# Patient Record
Sex: FEMALE | Race: WHITE | NOT HISPANIC OR LATINO | Employment: OTHER | URBAN - METROPOLITAN AREA
[De-identification: names, ages, dates, MRNs, and addresses within clinical notes are randomized per-mention and may not be internally consistent; named-entity substitution may affect disease eponyms.]

---

## 2017-01-31 DIAGNOSIS — I10 BENIGN ESSENTIAL HYPERTENSION: ICD-10-CM

## 2017-01-31 LAB
ANION GAP SERPL CALCULATED.3IONS-SCNC: 9 MMOL/L (ref 3–14)
BUN SERPL-MCNC: 12 MG/DL (ref 7–30)
CALCIUM SERPL-MCNC: 8.5 MG/DL (ref 8.5–10.1)
CHLORIDE SERPL-SCNC: 104 MMOL/L (ref 94–109)
CO2 SERPL-SCNC: 26 MMOL/L (ref 20–32)
CREAT SERPL-MCNC: 0.74 MG/DL (ref 0.52–1.04)
GFR SERPL CREATININE-BSD FRML MDRD: 80 ML/MIN/1.7M2
GLUCOSE SERPL-MCNC: 94 MG/DL (ref 70–99)
POTASSIUM SERPL-SCNC: 3.5 MMOL/L (ref 3.4–5.3)
SODIUM SERPL-SCNC: 139 MMOL/L (ref 133–144)

## 2017-01-31 PROCEDURE — 80048 BASIC METABOLIC PNL TOTAL CA: CPT | Performed by: INTERNAL MEDICINE

## 2017-01-31 PROCEDURE — 36415 COLL VENOUS BLD VENIPUNCTURE: CPT | Performed by: INTERNAL MEDICINE

## 2018-01-24 DIAGNOSIS — I10 BENIGN ESSENTIAL HYPERTENSION: ICD-10-CM

## 2018-01-24 NOTE — TELEPHONE ENCOUNTER
"Requested Prescriptions   Pending Prescriptions Disp Refills     triamterene-hydrochlorothiazide (DYAZIDE) 37.5-25 MG per capsule [Pharmacy Med Name: TRIAMTERENE-HCTZ 37.5-25MG CAPS]  Last Written Prescription Date:  12/20/2016  Last Fill Quantity: 90,  # refills: 3   Last Office Visit with [facility] or [facility] prescribing provider:  10/24/2016   Future Office Visit:      90 capsule 1     Sig: TAKE ONE CAPSULE BY MOUTH EVERY DAY    Diuretics (Including Combos) Protocol Failed    1/24/2018  1:36 PM       Failed - Blood pressure under 140/90    BP Readings from Last 3 Encounters:   10/24/16 133/81   10/07/16 137/89   08/31/16 (!) 160/109                Failed - Recent or future visit with authorizing provider's specialty    Patient had office visit in the last year or has a visit in the next 30 days with authorizing provider.  See \"Patient Info\" tab in inbasket, or \"Choose Columns\" in Meds & Orders section of the refill encounter.            Passed - Patient is age 18 or older       Passed - No active pregancy on record       Passed - Normal serum creatinine on file in past 12 months    Recent Labs   Lab Test  01/31/17   0730   CR  0.74             Passed - Normal serum potassium on file in past 12 months    Recent Labs   Lab Test  01/31/17   0730   POTASSIUM  3.5                   Passed - Normal serum sodium on file in past 12 months    Recent Labs   Lab Test  01/31/17   0730   NA  139             Passed - No positive pregnancy test in past 12 months        Beau Lezama RT (R)    "

## 2018-01-31 RX ORDER — TRIAMTERENE AND HYDROCHLOROTHIAZIDE 37.5; 25 MG/1; MG/1
1 CAPSULE ORAL DAILY
Qty: 30 CAPSULE | Refills: 0 | Status: SHIPPED | OUTPATIENT
Start: 2018-01-31 | End: 2018-02-02

## 2018-02-02 ENCOUNTER — OFFICE VISIT (OUTPATIENT)
Dept: FAMILY MEDICINE | Facility: CLINIC | Age: 63
End: 2018-02-02
Payer: COMMERCIAL

## 2018-02-02 VITALS
WEIGHT: 170.8 LBS | TEMPERATURE: 97.6 F | SYSTOLIC BLOOD PRESSURE: 132 MMHG | HEART RATE: 75 BPM | DIASTOLIC BLOOD PRESSURE: 89 MMHG | BODY MASS INDEX: 31.43 KG/M2 | HEIGHT: 62 IN

## 2018-02-02 DIAGNOSIS — M19.041 ARTHRITIS OF BOTH HANDS: Primary | ICD-10-CM

## 2018-02-02 DIAGNOSIS — I10 BENIGN ESSENTIAL HYPERTENSION: ICD-10-CM

## 2018-02-02 DIAGNOSIS — M19.042 ARTHRITIS OF BOTH HANDS: Primary | ICD-10-CM

## 2018-02-02 LAB
ANION GAP SERPL CALCULATED.3IONS-SCNC: 7 MMOL/L (ref 3–14)
BUN SERPL-MCNC: 12 MG/DL (ref 7–30)
CALCIUM SERPL-MCNC: 8.4 MG/DL (ref 8.5–10.1)
CHLORIDE SERPL-SCNC: 104 MMOL/L (ref 94–109)
CO2 SERPL-SCNC: 28 MMOL/L (ref 20–32)
CREAT SERPL-MCNC: 0.66 MG/DL (ref 0.52–1.04)
CRP SERPL-MCNC: 5.5 MG/L (ref 0–8)
GFR SERPL CREATININE-BSD FRML MDRD: >90 ML/MIN/1.7M2
GLUCOSE SERPL-MCNC: 85 MG/DL (ref 70–99)
POTASSIUM SERPL-SCNC: 3.4 MMOL/L (ref 3.4–5.3)
SODIUM SERPL-SCNC: 139 MMOL/L (ref 133–144)

## 2018-02-02 PROCEDURE — 80048 BASIC METABOLIC PNL TOTAL CA: CPT | Performed by: NURSE PRACTITIONER

## 2018-02-02 PROCEDURE — 36415 COLL VENOUS BLD VENIPUNCTURE: CPT | Performed by: NURSE PRACTITIONER

## 2018-02-02 PROCEDURE — 86431 RHEUMATOID FACTOR QUANT: CPT | Performed by: NURSE PRACTITIONER

## 2018-02-02 PROCEDURE — 99214 OFFICE O/P EST MOD 30 MIN: CPT | Performed by: NURSE PRACTITIONER

## 2018-02-02 PROCEDURE — 86140 C-REACTIVE PROTEIN: CPT | Performed by: NURSE PRACTITIONER

## 2018-02-02 RX ORDER — MELOXICAM 15 MG/1
15 TABLET ORAL DAILY
Qty: 30 TABLET | Refills: 3 | Status: SHIPPED | OUTPATIENT
Start: 2018-02-02 | End: 2018-10-11

## 2018-02-02 RX ORDER — TRIAMTERENE AND HYDROCHLOROTHIAZIDE 37.5; 25 MG/1; MG/1
1 CAPSULE ORAL DAILY
Qty: 90 CAPSULE | Refills: 3 | Status: SHIPPED | OUTPATIENT
Start: 2018-02-02 | End: 2019-03-12

## 2018-02-02 NOTE — PROGRESS NOTES
"  SUBJECTIVE:   Cameron Shah is a 62 year old female who presents to clinic today for the following health issues: blood pressure recheck, BP medication management.   Also complains of occasional mild pain in her hands, concern about possible arthritis. Denies edema, stiffness and difficulties using her hands.     Chief Complaint   Patient presents with     Refill Request     Pending      Health Maintenance     Notified she is due for mmamorgram and Colonoscopy      Musculoskeletal Problem     Possible arthirits, both knuckles have been starting to get painful, throbbing pain throuhout the day      Hypertension Follow-up      Outpatient blood pressures are not being checked.    Low Salt Diet: not monitoring salt      Amount of exercise or physical activity: 2-3 days/week for an average of 45-60 minutes    Problems taking medications regularly: No    Medication side effects: none    Diet: regular (no restrictions)    Problem list and histories reviewed & adjusted, as indicated.  Additional history: as documented    Labs reviewed in EPIC    Reviewed and updated as needed this visit by clinical staff  Tobacco  Allergies  Med Hx  Surg Hx  Fam Hx  Soc Hx      Reviewed and updated as needed this visit by Provider         ROS:  Constitutional, HEENT, cardiovascular, pulmonary, gi and gu systems are negative, except as otherwise noted.    OBJECTIVE:     /89  Pulse 75  Temp 97.6  F (36.4  C) (Tympanic)  Ht 5' 1.75\" (1.568 m)  Wt 170 lb 12.8 oz (77.5 kg)  BMI 31.49 kg/m2  Body mass index is 31.49 kg/(m^2).  GENERAL: healthy, alert and no distress  CV: regular rate and rhythm, normal S1 S2, no S3 or S4, no murmur, click or rub, no peripheral edema and peripheral pulses strong  MS: mild deformities of the bilateral hand knuckles, Heberden nodules. No edema and erythema noted.     Diagnostic Test Results:  pending    ASSESSMENT/PLAN:     1. Benign essential hypertension  -well controlled, refill provided   - " triamterene-hydrochlorothiazide (DYAZIDE) 37.5-25 MG per capsule; Take 1 capsule by mouth daily (Needs follow-up appointment for this medication)  Dispense: 90 capsule; Refill: 3  - Basic metabolic panel    2. Arthritis of both hands  -likely OA, mild deformities, Heberden nodules   -will also evaluate for possible RA  -advised patient that unfortunately there is no cure of OA, can try antiinflammatory medications, Tylenol as needed and physical therapy   - meloxicam (MOBIC) 15 MG tablet; Take 1 tablet (15 mg) by mouth daily  Dispense: 30 tablet; Refill: 3  - CRP inflammation  - Rheumatoid factor    See Patient Instructions    AB Jones [facility]

## 2018-02-02 NOTE — MR AVS SNAPSHOT
After Visit Summary   2/2/2018    Cameron Shah    MRN: [id number]           Patient Information     Date Of Birth          1955        Visit Information        Provider Department      2/2/2018 10:00 AM Luna Avalos APRN CNP [facility]        Today's Diagnoses     Arthritis of both hands    -  1    Benign essential hypertension          Care Instructions    Labs: recheck electrolytes, renal function  RA factor and inflammatory markers    Will consider rheumatology referral     Continue Dyazide    BP well controlled                         Follow-ups after your visit        Who to contact     If you have questions or need follow up information about today's clinic visit or your schedule please contact [facility] directly at [phone].  Normal or non-critical lab and imaging results will be communicated to you by WhoJamhart, letter or phone within 4 business days after the clinic has received the results. If you do not hear from us within 7 days, please contact the clinic through Who[employer] or phone. If you have a critical or abnormal lab result, we will notify you by phone as soon as possible.  Submit refill requests through [employer] or call your pharmacy and they will forward the refill request to us. Please allow 3 business days for your refill to be completed.          Additional Information About Your Visit        MyChart Information     [employer] gives you secure access to your electronic health record. If you see a primary care provider, you can also send messages to your care team and make appointments. If you have questions, please call your primary care clinic.  If you do not have a primary care provider, please call [phone] and they will assist you.        Care EveryWhere ID     This is your Care EveryWhere ID. This could be used by other organizations to access your [facility] medical records  [id number]        Your Vitals Were     Pulse  "Temperature Height BMI (Body Mass Index)          75 97.6  F (36.4  C) (Tympanic) 5' 1.75\" (1.568 m) 31.49 kg/m2         Blood Pressure from Last 3 Encounters:   02/02/18 132/89   10/24/16 133/81   10/07/16 137/89    Weight from Last 3 Encounters:   02/02/18 170 lb 12.8 oz (77.5 kg)   08/31/16 173 lb (78.5 kg)   08/23/16 173 lb (78.5 kg)              We Performed the Following     Basic metabolic panel     CRP inflammation     Rheumatoid factor          Today's Medication Changes          These changes are accurate as of 2/2/18 10:22 AM.  If you have any questions, ask your nurse or doctor.               Start taking these medicines.        Dose/Directions    meloxicam 15 MG tablet   Commonly known as:  MOBIC   Used for:  Arthritis of both hands   Started by:  Luna Avalos APRN CNP        Dose:  15 mg   Take 1 tablet (15 mg) by mouth daily   Quantity:  30 tablet   Refills:  3            Where to get your medicines      These medications [facility] - [place], [place] - [address] [place] [address]  [address] [place] [address], [facility] [address]     Phone:  [phone]     triamterene-hydrochlorothiazide 37.5-25 MG per capsule         These medications were sent to [id number] - [facility] [address] AT [facility] OF [address] [place] [address]  [address] [facility] [address]     Phone:  [phone]     meloxicam 15 MG tablet                Primary Care Provider Office Phone # Fax #    Chelle Rivka Linton [phone] [address] [facility] [address]        Equal Access to Services     SINA LARSON AH: Urszula Leslie, kendrick sarabia, qaybta kaallc vega, neno churchill. Carol [facility] [phone].    ATENCIÓN: Si habla español, tiene a asif disposición servicios gratuitos de asistencia lingüística. Llame al [phone].    We comply with applicable federal civil rights laws and " [place] laws. We do not discriminate on the basis of race, color, national origin, age, disability, sex, sexual orientation, or gender identity.            Thank you!     Thank you for choosing [facility]  for your care. Our goal is always to provide you with excellent care. Hearing back from our patients is one way we can continue to improve our services. Please take a few minutes to complete the written survey that you may receive in the mail after your visit with us. Thank you!             Your Updated Medication List - Protect others around you: Learn how to safely use, store and throw away your medicines at www.disposemymeds.org.          This list is accurate as of 2/2/18 10:22 AM.  Always use your most recent med list.                   Brand Name Dispense Instructions for use Diagnosis    ALEVE PO      Take 220 mg by mouth as needed        ibuprofen 200 MG tablet   Generic drug:  ibuprofen      1 TABLET EVERY 4 TO 6 HOURS AS NEEDED        meloxicam 15 MG tablet    MOBIC    30 tablet    Take 1 tablet (15 mg) by mouth daily    Arthritis of both hands       TRAVATAN Z 0.004 % ophthalmic solution   Generic drug:  travoprost (BAK Free)           triamcinolone 0.1 % ointment    KENALOG    30 g    Apply sparingly to affected area two times daily for 14 days.    Eczema       triamterene-hydrochlorothiazide 37.5-25 MG per capsule    DYAZIDE    90 capsule    Take 1 capsule by mouth daily (Needs follow-up appointment for this medication)    Benign essential hypertension       TYLENOL PO      Take 500 mg by mouth every 4 hours as needed for mild pain or fever

## 2018-02-02 NOTE — NURSING NOTE
"Chief Complaint   Patient presents with     Refill Request     Pending      Health Maintenance     Notified she is due for mmamorgram and Colonoscopy      Musculoskeletal Problem     Possible arthirits, both knuckles have been starting to get painful, throbbing pain throuhout the day        Initial /89  Pulse 75  Temp 97.6  F (36.4  C) (Tympanic)  Ht 5' 1.75\" (1.568 m)  Wt 170 lb 12.8 oz (77.5 kg)  BMI 31.49 kg/m2 Estimated body mass index is 31.49 kg/(m^2) as calculated from the following:    Height as of this encounter: 5' 1.75\" (1.568 m).    Weight as of this encounter: 170 lb 12.8 oz (77.5 kg).  Medication Reconciliation: complete  "

## 2018-02-02 NOTE — PATIENT INSTRUCTIONS
Labs: recheck electrolytes, renal function  RA factor and inflammatory markers    Will consider rheumatology referral     Continue Dyazide    BP well controlled

## 2018-02-04 LAB — RHEUMATOID FACT SER NEPH-ACNC: <20 IU/ML (ref 0–20)

## 2018-02-07 ENCOUNTER — HOSPITAL ENCOUNTER (OUTPATIENT)
Dept: MAMMOGRAPHY | Facility: CLINIC | Age: 63
Discharge: HOME | End: 2018-02-07
Attending: INTERNAL MEDICINE | Admitting: INTERNAL MEDICINE
Payer: COMMERCIAL

## 2018-02-07 DIAGNOSIS — Z12.31 VISIT FOR SCREENING MAMMOGRAM: ICD-10-CM

## 2018-02-07 PROCEDURE — 77063 BREAST TOMOSYNTHESIS BI: CPT

## 2018-02-12 DIAGNOSIS — Z12.11 SPECIAL SCREENING FOR MALIGNANT NEOPLASMS, COLON: Primary | ICD-10-CM

## 2018-02-13 ENCOUNTER — ANESTHESIA EVENT (OUTPATIENT)
Dept: GASTROENTEROLOGY | Facility: CLINIC | Age: 63
End: 2018-02-13
Payer: COMMERCIAL

## 2018-02-13 NOTE — ANESTHESIA PREPROCEDURE EVALUATION
"  Anesthesia Evaluation     . Pt has had prior anesthetic. Type: General and MAC    No history of anesthetic complications          ROS/MED HX    ENT/Pulmonary:  - neg pulmonary ROS     Neurologic:  - neg neurologic ROS     Cardiovascular:     (+) hypertension----. : . . . :. .       METS/Exercise Tolerance:     Hematologic:  - neg hematologic  ROS       Musculoskeletal:  - neg musculoskeletal ROS       GI/Hepatic:  - neg GI/hepatic ROS       Renal/Genitourinary:  - ROS Renal section negative       Endo:     (+) Other Endocrine Disorder Hyperparathyroidism.      Psychiatric:     (+) psychiatric history anxiety      Infectious Disease:  - neg infectious disease ROS       Malignancy:      - no malignancy   Other: Comment: Hypercalcemia   - neg other ROS                 Physical Exam  Normal systems: cardiovascular, pulmonary and dental    Airway   Mallampati: III  TM distance: >3 FB  Neck ROM: full    Dental     Cardiovascular       Pulmonary     Other findings: Patient has been in the past that she has a \"small airway\".                Anesthesia Plan      History & Physical Review  History and physical reviewed and following examination; no interval change.    ASA Status:  2 .    NPO Status:  > 4 hours    Plan for MAC Reason for MAC:  Deep or markedly invasive procedure (G8)         Postoperative Care      Consents  Anesthetic plan, risks, benefits and alternatives discussed with:  Patient..                          .  "

## 2018-02-15 ENCOUNTER — ANESTHESIA (OUTPATIENT)
Dept: GASTROENTEROLOGY | Facility: CLINIC | Age: 63
End: 2018-02-15
Payer: COMMERCIAL

## 2018-02-15 ENCOUNTER — HOSPITAL ENCOUNTER (OUTPATIENT)
Facility: CLINIC | Age: 63
Discharge: HOME | End: 2018-02-15
Attending: SURGERY | Admitting: SURGERY
Payer: COMMERCIAL

## 2018-02-15 ENCOUNTER — SURGERY (OUTPATIENT)
Age: 63
End: 2018-02-15

## 2018-02-15 VITALS
OXYGEN SATURATION: 97 % | HEIGHT: 62 IN | DIASTOLIC BLOOD PRESSURE: 75 MMHG | SYSTOLIC BLOOD PRESSURE: 137 MMHG | BODY MASS INDEX: 31.28 KG/M2 | HEART RATE: 71 BPM | RESPIRATION RATE: 16 BRPM | WEIGHT: 170 LBS | TEMPERATURE: 98.8 F

## 2018-02-15 LAB — COLONOSCOPY: NORMAL

## 2018-02-15 PROCEDURE — 37000008 ZZH ANESTHESIA TECHNICAL FEE, 1ST 30 MIN: Performed by: SURGERY

## 2018-02-15 PROCEDURE — 25000128 H RX IP 250 OP 636: Performed by: NURSE ANESTHETIST, CERTIFIED REGISTERED

## 2018-02-15 PROCEDURE — 88305 TISSUE EXAM BY PATHOLOGIST: CPT | Performed by: SURGERY

## 2018-02-15 PROCEDURE — 45385 COLONOSCOPY W/LESION REMOVAL: CPT | Mod: PT | Performed by: SURGERY

## 2018-02-15 PROCEDURE — 25000125 ZZHC RX 250: Performed by: NURSE ANESTHETIST, CERTIFIED REGISTERED

## 2018-02-15 PROCEDURE — 45385 COLONOSCOPY W/LESION REMOVAL: CPT | Performed by: SURGERY

## 2018-02-15 PROCEDURE — 25000128 H RX IP 250 OP 636: Performed by: SURGERY

## 2018-02-15 PROCEDURE — 88305 TISSUE EXAM BY PATHOLOGIST: CPT | Mod: 26 | Performed by: SURGERY

## 2018-02-15 PROCEDURE — 25000125 ZZHC RX 250: Performed by: SURGERY

## 2018-02-15 RX ORDER — LIDOCAINE 40 MG/G
CREAM TOPICAL
Status: DISCONTINUED | OUTPATIENT
Start: 2018-02-15 | End: 2018-02-15 | Stop reason: HOSPADM

## 2018-02-15 RX ORDER — PROPOFOL 10 MG/ML
INJECTION, EMULSION INTRAVENOUS PRN
Status: DISCONTINUED | OUTPATIENT
Start: 2018-02-15 | End: 2018-02-15

## 2018-02-15 RX ORDER — SODIUM CHLORIDE, SODIUM LACTATE, POTASSIUM CHLORIDE, CALCIUM CHLORIDE 600; 310; 30; 20 MG/100ML; MG/100ML; MG/100ML; MG/100ML
INJECTION, SOLUTION INTRAVENOUS CONTINUOUS
Status: DISCONTINUED | OUTPATIENT
Start: 2018-02-15 | End: 2018-02-15 | Stop reason: HOSPADM

## 2018-02-15 RX ORDER — ONDANSETRON 2 MG/ML
4 INJECTION INTRAMUSCULAR; INTRAVENOUS
Status: DISCONTINUED | OUTPATIENT
Start: 2018-02-15 | End: 2018-02-15 | Stop reason: HOSPADM

## 2018-02-15 RX ORDER — PROPOFOL 10 MG/ML
INJECTION, EMULSION INTRAVENOUS CONTINUOUS PRN
Status: DISCONTINUED | OUTPATIENT
Start: 2018-02-15 | End: 2018-02-15

## 2018-02-15 RX ORDER — LIDOCAINE HYDROCHLORIDE 10 MG/ML
INJECTION, SOLUTION INFILTRATION; PERINEURAL PRN
Status: DISCONTINUED | OUTPATIENT
Start: 2018-02-15 | End: 2018-02-15

## 2018-02-15 RX ADMIN — LIDOCAINE HYDROCHLORIDE 50 MG: 10 INJECTION, SOLUTION INFILTRATION; PERINEURAL at 09:50

## 2018-02-15 RX ADMIN — SODIUM CHLORIDE, POTASSIUM CHLORIDE, SODIUM LACTATE AND CALCIUM CHLORIDE: 600; 310; 30; 20 INJECTION, SOLUTION INTRAVENOUS at 08:49

## 2018-02-15 RX ADMIN — PROPOFOL 150 MCG/KG/MIN: 10 INJECTION, EMULSION INTRAVENOUS at 09:50

## 2018-02-15 RX ADMIN — PROPOFOL 100 MG: 10 INJECTION, EMULSION INTRAVENOUS at 09:50

## 2018-02-15 RX ADMIN — LIDOCAINE HYDROCHLORIDE 1 ML: 10 INJECTION, SOLUTION EPIDURAL; INFILTRATION; INTRACAUDAL; PERINEURAL at 08:50

## 2018-02-15 NOTE — H&P
"62 year old year old female here for colonoscopy for screening.    Patient Active Problem List   Diagnosis     Papanicolaou smear of cervix with low grade squamous intraepithelial lesion (LGSIL)     DJD (degenerative joint disease) of cervical spine     Contact dermatitis     LT hand Ring Finger Injury at PIP joint     CARDIOVASCULAR SCREENING; LDL GOAL LESS THAN 160     Advanced directives, counseling/discussion     Anxiety     Benign essential hypertension     Hypercalcemia     Hyperparathyroidism (H)     Family history of factor V deficiency       Past Medical History:   Diagnosis Date     Endometriosis, site unspecified      Hypertension 2015     Osteoporosis July 29, 2016    Osteoporosis     Other and unspecified ovarian cyst      Papanicolaou smear of cervix with low grade squamous intraepithelial lesion (LGSIL) 2651-2614    cryo and lazer treatment       Past Surgical History:   Procedure Laterality Date     APPENDECTOMY  age 9     HYSTERECTOMY, PAP NO LONGER INDICATED       HYSTERECTOMY, JERAMY  8/03     PARATHYROIDECTOMY N/A 8/31/2016    Procedure: PARATHYROIDECTOMY;  Surgeon: Yi Pham MD;  Location: [facility] OR     SALPINGO OOPHORECTOMY,R/L/ELIANE  8/03    right     TONSILLECTOMY  age 7       [unfilled]    No current outpatient prescriptions on file.       Allergies   Allergen Reactions     Oxycodone Nausea       Pt reports that she has never smoked. She has never used smokeless tobacco. She reports that she drinks alcohol. She reports that she does not use illicit drugs.    Exam:  BP (!) 154/96  Temp 98.8  F (37.1  C) (Oral)  Resp 18  Ht 1.568 m (5' 1.75\")  Wt 77.1 kg (170 lb)  SpO2 98%  BMI 31.35 kg/m2    Awake, Alert OX3  Lungs - CTA bilaterally  CV - RRR, no murmurs, distal pulses intact  Abd - soft, non-distended, non-tender, +BS  Extr - No cyanosis or edema    A/P 62 year old year old female in need of colonoscopy for screening. Risks, benefits, alternatives, and complications were discussed " including the possibility of perforation and the patient agreed to proceed    Daniel Doherty MD

## 2018-02-15 NOTE — ANESTHESIA POSTPROCEDURE EVALUATION
Patient: Cameron Shah    Procedure(s):  colonoscopy - Wound Class: II-Clean Contaminated    Diagnosis:screening  Diagnosis Additional Information: No value filed.    Anesthesia Type:  MAC    Note:  Anesthesia Post Evaluation    Patient location during evaluation: Phase 2  Patient participation: Able to fully participate in evaluation  Level of consciousness: awake  Pain management: adequate  Airway patency: patent  Cardiovascular status: acceptable  Respiratory status: acceptable  Hydration status: acceptable  PONV: none     Anesthetic complications: None          Last vitals:  Vitals:    02/15/18 0824   BP: (!) 154/96   Resp: 18   Temp: 37.1  C (98.8  F)   SpO2: 98%         Electronically Signed By: AB Hoffman CRNA  February 15, 2018  10:08 AM

## 2018-02-15 NOTE — ANESTHESIA CARE TRANSFER NOTE
Patient: Cameron Shah    Procedure(s):  colonoscopy - Wound Class: II-Clean Contaminated    Diagnosis: screening  Diagnosis Additional Information: No value filed.    Anesthesia Type:   MAC     Note:  Airway :Room Air  Patient transferred to:Phase II  Handoff Report: Identifed the Patient, Identified the Reponsible Provider, Reviewed the pertinent medical history, Discussed the surgical course, Reviewed Intra-OP anesthesia mangement and issues during anesthesia, Set expectations for post-procedure period and Allowed opportunity for questions and acknowledgement of understanding      Vitals: (Last set prior to Anesthesia Care Transfer)    CRNA VITALS  2/15/2018 0937 - 2/15/2018 1007      2/15/2018             Pulse: 79    Ht Rate: 80    SpO2: 98 %                Electronically Signed By: AB Hoffman CRNA  February 15, 2018  10:07 AM

## 2018-02-19 LAB — COPATH REPORT: NORMAL

## 2018-10-11 DIAGNOSIS — M19.041 ARTHRITIS OF BOTH HANDS: ICD-10-CM

## 2018-10-11 DIAGNOSIS — M19.042 ARTHRITIS OF BOTH HANDS: ICD-10-CM

## 2018-10-11 RX ORDER — MELOXICAM 15 MG/1
TABLET ORAL
Qty: 30 TABLET | Refills: 11 | Status: SHIPPED | OUTPATIENT
Start: 2018-10-11

## 2018-10-11 NOTE — TELEPHONE ENCOUNTER
"Requested Prescriptions   Pending Prescriptions Disp Refills     meloxicam (MOBIC) 15 MG tablet [Pharmacy Med Name: MELOXICAM 15MG TABLETS] 30 tablet 0     Sig: TAKE 1 TABLET(15 MG) BY MOUTH DAILY    NSAID Medications Failed    10/11/2018 11:15 AM       Failed - Normal ALT on file in past 12 months    Recent Labs   Lab Test  10/19/13   1450   ALT  46            Failed - Normal AST on file in past 12 months    Recent Labs   Lab Test  10/19/13   1450   AST  39            Failed - Normal CBC on file in past 12 months    Recent Labs   Lab Test  03/17/16   0842   WBC  7.6   RBC  4.70   HGB  13.5   HCT  41.7   PLT  290       For [facility] ONLY: [id number] = WBC, [id number] = RBC         Passed - Blood pressure under 140/90 in past 12 months    BP Readings from Last 3 Encounters:   02/15/18 137/75   02/02/18 132/89   10/24/16 133/81                Passed - Recent (12 mo) or future (30 days) visit within the authorizing provider's specialty    Patient had office visit in the last 12 months or has a visit in the next 30 days with authorizing provider or within the authorizing provider's specialty.  See \"Patient Info\" tab in inbasket, or \"Choose Columns\" in Meds & Orders section of the refill encounter.           Passed - Patient is age 6-64 years       Passed - No active pregnancy on record       Passed - Normal serum creatinine on file in past 12 months    Recent Labs   Lab Test  02/02/18   1030   CR  0.66            Passed - No positive pregnancy test in past 12 months      Last Written Prescription Date:  2/2/18  Last Fill Quantity: 30,  # refills: 3   Last office visit: 2/2/2018 with prescribing provider:  Robbie   Future Office Visit:      Routing refill request to provider for review/approval because:  Labs not current:  CBC last completed 2016, AST And ALT last completed 2013  A break in medication        "

## 2019-03-12 ENCOUNTER — TELEPHONE (OUTPATIENT)
Dept: FAMILY MEDICINE | Facility: CLINIC | Age: 64
End: 2019-03-12

## 2019-03-12 DIAGNOSIS — I10 BENIGN ESSENTIAL HYPERTENSION: ICD-10-CM

## 2019-03-12 RX ORDER — TRIAMTERENE AND HYDROCHLOROTHIAZIDE 37.5; 25 MG/1; MG/1
1 CAPSULE ORAL DAILY
Qty: 30 CAPSULE | Refills: 0 | Status: SHIPPED | OUTPATIENT
Start: 2019-03-12

## 2019-03-12 NOTE — TELEPHONE ENCOUNTER
"Last Office Visit: 2/2/18  Medication last filled 2/2/18 for 12 month fill  Requested Prescriptions   Pending Prescriptions Disp Refills     triamterene-HCTZ (DYAZIDE) 37.5-25 MG capsule [Pharmacy Med Name: TRIAMTERENE-HCTZ 37.5-25MG CAPS] 90 capsule 3     Sig: TAKE ONE CAPSULE BY MOUTH EVERY DAY    Diuretics (Including Combos) Protocol Failed - 3/12/2019 11:14 AM       Failed - Blood pressure under 140/90 in past 12 months    BP Readings from Last 3 Encounters:   02/15/18 137/75   02/02/18 132/89   10/24/16 133/81                Failed - Recent (12 mo) or future (30 days) visit within the authorizing provider's specialty    Patient had office visit in the last 12 months or has a visit in the next 30 days with authorizing provider or within the authorizing provider's specialty.  See \"Patient Info\" tab in inbasket, or \"Choose Columns\" in Meds & Orders section of the refill encounter.             Failed - Normal serum creatinine on file in past 12 months    Recent Labs   Lab Test 02/02/18  1030   CR 0.66             Failed - Normal serum potassium on file in past 12 months    Recent Labs   Lab Test 02/02/18  1030   POTASSIUM 3.4                   Failed - Normal serum sodium on file in past 12 months    Recent Labs   Lab Test 02/02/18  1030                Passed - Medication is active on med list       Passed - Patient is age 18 or older       Passed - No active pregancy on record       Passed - No positive pregnancy test in past 12 months        Routed to provider:  Labs not current, patient last OV more than 12 months ago  "

## 2019-03-12 NOTE — TELEPHONE ENCOUNTER
"Requested Prescriptions   Pending Prescriptions Disp Refills     triamterene-HCTZ (DYAZIDE) 37.5-25 MG capsule [Pharmacy Med Name: TRIAMTERENE-HCTZ 37.5-25MG CAPS] 90 capsule 3    Last Written Prescription Date:  2/2/18  Last Fill Quantity: 90 cap,  # refills: 3   Last office visit: 2/2/2018 with prescribing provider:  Robbie   Future Office Visit:     Sig: TAKE ONE CAPSULE BY MOUTH EVERY DAY    Diuretics (Including Combos) Protocol Failed - 3/12/2019 10:11 AM       Failed - Blood pressure under 140/90 in past 12 months    BP Readings from Last 3 Encounters:   02/15/18 137/75   02/02/18 132/89   10/24/16 133/81                Failed - Recent (12 mo) or future (30 days) visit within the authorizing provider's specialty    Patient had office visit in the last 12 months or has a visit in the next 30 days with authorizing provider or within the authorizing provider's specialty.  See \"Patient Info\" tab in inbasket, or \"Choose Columns\" in Meds & Orders section of the refill encounter.             Failed - Normal serum creatinine on file in past 12 months    Recent Labs   Lab Test 02/02/18  1030   CR 0.66             Failed - Normal serum potassium on file in past 12 months    Recent Labs   Lab Test 02/02/18  1030   POTASSIUM 3.4                   Failed - Normal serum sodium on file in past 12 months    Recent Labs   Lab Test 02/02/18  1030                Passed - Medication is active on med list       Passed - Patient is age 18 or older       Passed - No active pregancy on record       Passed - No positive pregnancy test in past 12 months          "

## 2019-03-14 NOTE — TELEPHONE ENCOUNTER
Patient notified and understands plan.  She states she is seeing a new provider outside of [facility] so will not be following up. Mildred LORD RN

## 2019-11-03 ENCOUNTER — HEALTH MAINTENANCE LETTER (OUTPATIENT)
Age: 64
End: 2019-11-03

## 2020-11-16 ENCOUNTER — HEALTH MAINTENANCE LETTER (OUTPATIENT)
Age: 65
End: 2020-11-16

## 2021-07-21 ENCOUNTER — RECORDS - HEALTHEAST (OUTPATIENT)
Dept: ADMINISTRATIVE | Facility: CLINIC | Age: 66
End: 2021-07-21

## 2021-07-24 ENCOUNTER — HEALTH MAINTENANCE LETTER (OUTPATIENT)
Age: 66
End: 2021-07-24

## 2021-07-25 ENCOUNTER — HOSPITAL ENCOUNTER (EMERGENCY)
Facility: CLINIC | Age: 66
Discharge: HOME | End: 2021-07-25
Attending: STUDENT IN AN ORGANIZED HEALTH CARE EDUCATION/TRAINING PROGRAM | Admitting: STUDENT IN AN ORGANIZED HEALTH CARE EDUCATION/TRAINING PROGRAM
Payer: MEDICARE

## 2021-07-25 ENCOUNTER — APPOINTMENT (OUTPATIENT)
Dept: MRI IMAGING | Facility: CLINIC | Age: 66
End: 2021-07-25
Attending: STUDENT IN AN ORGANIZED HEALTH CARE EDUCATION/TRAINING PROGRAM
Payer: MEDICARE

## 2021-07-25 VITALS
OXYGEN SATURATION: 97 % | RESPIRATION RATE: 16 BRPM | HEART RATE: 74 BPM | TEMPERATURE: 97.9 F | SYSTOLIC BLOOD PRESSURE: 147 MMHG | BODY MASS INDEX: 32.27 KG/M2 | WEIGHT: 175 LBS | DIASTOLIC BLOOD PRESSURE: 97 MMHG

## 2021-07-25 DIAGNOSIS — H81.399 PERIPHERAL VERTIGO, UNSPECIFIED LATERALITY: ICD-10-CM

## 2021-07-25 LAB
ALBUMIN SERPL-MCNC: 4 G/DL (ref 3.4–5)
ALBUMIN UR-MCNC: NEGATIVE MG/DL
ALP SERPL-CCNC: 58 U/L (ref 40–150)
ALT SERPL W P-5'-P-CCNC: 23 U/L (ref 0–50)
ANION GAP SERPL CALCULATED.3IONS-SCNC: 8 MMOL/L (ref 3–14)
APPEARANCE UR: CLEAR
AST SERPL W P-5'-P-CCNC: 15 U/L (ref 0–45)
BASOPHILS # BLD AUTO: 0.1 10E3/UL (ref 0–0.2)
BASOPHILS NFR BLD AUTO: 1 %
BILIRUB SERPL-MCNC: 0.7 MG/DL (ref 0.2–1.3)
BILIRUB UR QL STRIP: NEGATIVE
BUN SERPL-MCNC: 13 MG/DL (ref 7–30)
CALCIUM SERPL-MCNC: 9.4 MG/DL (ref 8.5–10.1)
CHLORIDE BLD-SCNC: 106 MMOL/L (ref 94–109)
CO2 SERPL-SCNC: 23 MMOL/L (ref 20–32)
COLOR UR AUTO: YELLOW
CREAT SERPL-MCNC: 0.75 MG/DL (ref 0.52–1.04)
EOSINOPHIL # BLD AUTO: 0.1 10E3/UL (ref 0–0.7)
EOSINOPHIL NFR BLD AUTO: 1 %
ERYTHROCYTE [DISTWIDTH] IN BLOOD BY AUTOMATED COUNT: 13.4 % (ref 10–15)
GFR SERPL CREATININE-BSD FRML MDRD: 84 ML/MIN/1.73M2
GLUCOSE BLD-MCNC: 110 MG/DL (ref 70–99)
GLUCOSE UR STRIP-MCNC: NEGATIVE MG/DL
HCT VFR BLD AUTO: 44.9 % (ref 35–47)
HGB BLD-MCNC: 14.9 G/DL (ref 11.7–15.7)
HGB UR QL STRIP: NEGATIVE
HOLD SPECIMEN: NORMAL
IMM GRANULOCYTES # BLD: 0 10E3/UL
IMM GRANULOCYTES NFR BLD: 0 %
KETONES UR STRIP-MCNC: NEGATIVE MG/DL
LEUKOCYTE ESTERASE UR QL STRIP: ABNORMAL
LYMPHOCYTES # BLD AUTO: 1.6 10E3/UL (ref 0.8–5.3)
LYMPHOCYTES NFR BLD AUTO: 17 %
MCH RBC QN AUTO: 29 PG (ref 26.5–33)
MCHC RBC AUTO-ENTMCNC: 33.2 G/DL (ref 31.5–36.5)
MCV RBC AUTO: 87 FL (ref 78–100)
MONOCYTES # BLD AUTO: 0.5 10E3/UL (ref 0–1.3)
MONOCYTES NFR BLD AUTO: 6 %
NEUTROPHILS # BLD AUTO: 6.9 10E3/UL (ref 1.6–8.3)
NEUTROPHILS NFR BLD AUTO: 75 %
NITRATE UR QL: NEGATIVE
NRBC # BLD AUTO: 0 10E3/UL
NRBC BLD AUTO-RTO: 0 /100
PH UR STRIP: 7 [PH] (ref 5–7)
PLATELET # BLD AUTO: 344 10E3/UL (ref 150–450)
POTASSIUM BLD-SCNC: 3.4 MMOL/L (ref 3.4–5.3)
PROT SERPL-MCNC: 7.7 G/DL (ref 6.8–8.8)
RBC # BLD AUTO: 5.14 10E6/UL (ref 3.8–5.2)
RBC URINE: 1 /HPF
SODIUM SERPL-SCNC: 137 MMOL/L (ref 133–144)
SP GR UR STRIP: 1.01 (ref 1–1.03)
SQUAMOUS EPITHELIAL: 3 /HPF
UROBILINOGEN UR STRIP-MCNC: NORMAL MG/DL
WBC # BLD AUTO: 9.2 10E3/UL (ref 4–11)
WBC URINE: 9 /HPF

## 2021-07-25 PROCEDURE — 82040 ASSAY OF SERUM ALBUMIN: CPT | Performed by: STUDENT IN AN ORGANIZED HEALTH CARE EDUCATION/TRAINING PROGRAM

## 2021-07-25 PROCEDURE — 250N000013 HC RX MED GY IP 250 OP 250 PS 637: Performed by: STUDENT IN AN ORGANIZED HEALTH CARE EDUCATION/TRAINING PROGRAM

## 2021-07-25 PROCEDURE — 99285 EMERGENCY DEPT VISIT HI MDM: CPT | Mod: 25 | Performed by: STUDENT IN AN ORGANIZED HEALTH CARE EDUCATION/TRAINING PROGRAM

## 2021-07-25 PROCEDURE — A9585 GADOBUTROL INJECTION: HCPCS | Performed by: STUDENT IN AN ORGANIZED HEALTH CARE EDUCATION/TRAINING PROGRAM

## 2021-07-25 PROCEDURE — 96360 HYDRATION IV INFUSION INIT: CPT | Performed by: STUDENT IN AN ORGANIZED HEALTH CARE EDUCATION/TRAINING PROGRAM

## 2021-07-25 PROCEDURE — 70553 MRI BRAIN STEM W/O & W/DYE: CPT

## 2021-07-25 PROCEDURE — 93005 ELECTROCARDIOGRAM TRACING: CPT | Performed by: STUDENT IN AN ORGANIZED HEALTH CARE EDUCATION/TRAINING PROGRAM

## 2021-07-25 PROCEDURE — 81001 URINALYSIS AUTO W/SCOPE: CPT | Performed by: STUDENT IN AN ORGANIZED HEALTH CARE EDUCATION/TRAINING PROGRAM

## 2021-07-25 PROCEDURE — 85025 COMPLETE CBC W/AUTO DIFF WBC: CPT | Performed by: STUDENT IN AN ORGANIZED HEALTH CARE EDUCATION/TRAINING PROGRAM

## 2021-07-25 PROCEDURE — 255N000002 HC RX 255 OP 636: Performed by: STUDENT IN AN ORGANIZED HEALTH CARE EDUCATION/TRAINING PROGRAM

## 2021-07-25 PROCEDURE — 258N000003 HC RX IP 258 OP 636: Performed by: STUDENT IN AN ORGANIZED HEALTH CARE EDUCATION/TRAINING PROGRAM

## 2021-07-25 PROCEDURE — 36415 COLL VENOUS BLD VENIPUNCTURE: CPT | Performed by: STUDENT IN AN ORGANIZED HEALTH CARE EDUCATION/TRAINING PROGRAM

## 2021-07-25 PROCEDURE — 93010 ELECTROCARDIOGRAM REPORT: CPT | Performed by: STUDENT IN AN ORGANIZED HEALTH CARE EDUCATION/TRAINING PROGRAM

## 2021-07-25 RX ORDER — GADOBUTROL 604.72 MG/ML
8 INJECTION INTRAVENOUS ONCE
Status: COMPLETED | OUTPATIENT
Start: 2021-07-25 | End: 2021-07-25

## 2021-07-25 RX ORDER — MECLIZINE HYDROCHLORIDE 25 MG/1
25-50 TABLET ORAL EVERY 6 HOURS PRN
Qty: 30 TABLET | Refills: 0 | Status: SHIPPED | OUTPATIENT
Start: 2021-07-25

## 2021-07-25 RX ORDER — MECLIZINE HYDROCHLORIDE 25 MG/1
50 TABLET ORAL ONCE
Status: COMPLETED | OUTPATIENT
Start: 2021-07-25 | End: 2021-07-25

## 2021-07-25 RX ADMIN — SODIUM CHLORIDE, POTASSIUM CHLORIDE, SODIUM LACTATE AND CALCIUM CHLORIDE 1000 ML: 600; 310; 30; 20 INJECTION, SOLUTION INTRAVENOUS at 10:36

## 2021-07-25 RX ADMIN — MECLIZINE HYDROCHLORIDE 50 MG: 25 TABLET ORAL at 10:52

## 2021-07-25 RX ADMIN — GADOBUTROL 8 ML: 604.72 INJECTION INTRAVENOUS at 10:58

## 2021-07-25 NOTE — ED NOTES
Pt here with spouse with c/o dizziness since Friday. Pt took meclizine at home with no relief. No vomiting. Gait slightly  Unsteady but able to ambulate with SBA. Pt denies focal numbness or weakness. MRI checklist completed, pt changed into MRI gown.

## 2021-07-25 NOTE — ED PROVIDER NOTES
"  History     Chief Complaint   Patient presents with     Dizziness     HPI  Cameron Shah is a 66 year old female with past medical history which includes anxiety, hypertension, and hyperparathyroidism who presents for evaluation of dizziness.  Patient explains that Friday evening she developed a dizziness sensation\" like a carousel\" spinning around and reminiscent to an episode of vertigo from 1 year ago.  However her symptoms have persisted since Friday evening, exacerbated with movement and ambulation, improved with closing eyes and resting but not completely resolved.  She had taken some meclizine yesterday without change in symptoms.  Secondarily she notes 3 episodes of nonbloody diarrhea yesterday morning but that is also resolved.  She has been without headache, neck pain, visual changes, earache, chest pain, palpitations, cough, shortness of breath, nausea/vomiting, abdominal pain, or other symptoms.    Allergies:  Allergies   Allergen Reactions     Oxycodone Nausea       Problem List:    Patient Active Problem List    Diagnosis Date Noted     Family history of factor V deficiency 08/23/2016     Priority: Medium     Mother and brother.  Patient has not been tested.       Hyperparathyroidism (H) 08/22/2016     Priority: Medium     Hypercalcemia 03/22/2016     Priority: Medium     Benign essential hypertension 08/10/2015     Priority: Medium     Anxiety 02/26/2013     Priority: Medium     Advanced directives, counseling/discussion 10/30/2012     Priority: Medium     CARDIOVASCULAR SCREENING; LDL GOAL LESS THAN 160 10/31/2010     Priority: Medium     LT hand Ring Finger Injury at PIP joint 04/22/2009     Priority: Medium     DJD (degenerative joint disease) of cervical spine 10/31/2008     Priority: Medium     Contact dermatitis 10/31/2008     Priority: Medium     Papanicolaou smear of cervix with low grade squamous intraepithelial lesion (LGSIL)      Priority: Medium     cryo and lazer treatment      "     Past Medical History:    Past Medical History:   Diagnosis Date     Endometriosis, site unspecified      Hypertension      Osteoporosis 2016     Other and unspecified ovarian cyst      Papanicolaou smear of cervix with low grade squamous intraepithelial lesion (LGSIL) [id number]       Past Surgical History:    Past Surgical History:   Procedure Laterality Date     APPENDECTOMY  age 9     HYSTERECTOMY, PAP NO LONGER INDICATED       HYSTERECTOMY, JERAMY       PARATHYROIDECTOMY N/A 2016    Procedure: PARATHYROIDECTOMY;  Surgeon: Yi Pham MD;  Location: [facility]     SALPINGO OOPHORECTOMY,R/L/ELIANE      right     TONSILLECTOMY  age 7       Family History:    Family History   Problem Relation Age of Onset     Hypertension Mother      Thrombosis Mother         leg, has Factor V     C.A.D. Father         stents and bypass     Cancer Father         throat     Alcohol/Drug Maternal Grandmother          brain anyersm     Alcohol/Drug Maternal Grandfather      Alcohol/Drug Paternal Grandmother      Alcohol/Drug Paternal Grandfather      Hypertension Brother      Cancer Brother         lumps in arm and shoulder.  3 rounds of chemo     Hypertension Brother      Hypertension Sister      Hypertension Sister      Thrombosis Brother         has factor V     Breast Cancer No family hx of      Colon Cancer No family hx of        Social History:  Marital Status:   [2]  Social History     Tobacco Use     Smoking status: Never Smoker     Smokeless tobacco: Never Used   Substance Use Topics     Alcohol use: Yes     Comment: 2 wine a week     Drug use: No        Medications:    meclizine (ANTIVERT) 25 MG tablet  Acetaminophen (TYLENOL PO)  IBU-200 200 MG PO TABS  meloxicam (MOBIC) 15 MG tablet  Naproxen Sodium (ALEVE PO)  TRAVATAN Z 0.004 % ophthalmic solution  triamcinolone (KENALOG) 0.1 % ointment  triamterene-HCTZ (DYAZIDE) 37.5-25 MG capsule          Review of Systems  Constitutional:   Negative for fever or chills.  Eye: Negative for visual field deficits or double vision.  Cardiovascular:  Negative for palpitations or chest discomfort.  Respiratory:  Negative for cough or respiratory infectious symptoms.   Gastrointestinal: Diarrhea has resolved.  Negative for abdominal pain, nausea or vomiting.  Genitourinary:  Negative for dysuria or urgency.  Musculoskeletal: Negative for neck stiffness or back pain.  Neurological: Positive for room spinning sensation.  Negative for headache, weakness or sensory deficits.    All others reviewed and are negative.      Physical Exam   BP: (!) 148/99  Pulse: 100  Temp: 97.9  F (36.6  C)  Resp: 16  Weight: 79.4 kg (175 lb)  SpO2: 97 %      Physical Exam  Constitutional:  Well developed, well nourished.  Appears nontoxic and in no acute distress.    HENT:  Normocephalic and atraumatic.  Symmetric in appearance no facial droop.  Eyes:  Conjunctivae are normal.  Negative visual field deficit.  EOMI and denies diplopia.  PERRL.  Horizontal leftward beating nystagmus.  Negative test of skew.  Neck:  Neck supple.  Cardiovascular:  No cyanosis.  RRR.  No audible murmurs noted.    Respiratory:  Effort normal without sign of respiratory distress.  No audible wheezing or stridor.  CTAB.   Gastrointestinal:  Soft nondistended abdomen.  Nontender and without guarding.  No rigidity or rebound tenderness.  Negative Frey's sign.  Negative McBurney's point.    Musculoskeletal:  Moves extremities spontaneously.  Neurological:  Patient is alert.  Skin:  Skin is warm and dry.  Psychiatric:  Normal mood and affect.      ED Course        Procedures                EKG Interpretation:      Interpreted by: Arya Kidd  Time reviewed: Upon completion    Symptoms at time of EKG: Dizziness  Rhythm: Sinus  Rate: Normal  Axis: Normal    Conduction: None atypical   ST Segments/ T Waves: No pathologic ST-elevations or T-wave abnormalities.  Q Waves: None  Comparison to prior: Similar  morphology to previous     Clinical Impression: No sign of ischemia or arrhythmia         Critical Care time:  none               Results for orders placed or performed during the hospital encounter of 07/25/21 (from the past 24 hour(s))   CBC with platelets differential    Narrative    The following orders were created for panel order CBC with platelets differential.  Procedure                               Abnormality         Status                     ---------                               -----------         ------                     CBC with platelets and d...[[id number]]                      Final result                 Please view results for these tests on the individual orders.   Comprehensive metabolic panel   Result Value Ref Range    Sodium 137 133 - 144 mmol/L    Potassium 3.4 3.4 - 5.3 mmol/L    Chloride 106 94 - 109 mmol/L    Carbon Dioxide (CO2) 23 20 - 32 mmol/L    Anion Gap 8 3 - 14 mmol/L    Urea Nitrogen 13 7 - 30 mg/dL    Creatinine 0.75 0.52 - 1.04 mg/dL    Calcium 9.4 8.5 - 10.1 mg/dL    Glucose 110 (H) 70 - 99 mg/dL    Alkaline Phosphatase 58 40 - 150 U/L    AST 15 0 - 45 U/L    ALT 23 0 - 50 U/L    Protein Total 7.7 6.8 - 8.8 g/dL    Albumin 4.0 3.4 - 5.0 g/dL    Bilirubin Total 0.7 0.2 - 1.3 mg/dL    GFR Estimate 84 >60 mL/min/1.73m2   [facility]    Narrative    The following orders were created for panel order [facility].  Procedure                               Abnormality         Status                     ---------                               -----------         ------                     Extra Red Top Tube[[id number]]                               Final result               Extra Green Top (Lithium...[[id number]]                                                   Please view results for these tests on the individual orders.   UA reflex to Microscopic   Result Value Ref Range    Color Urine Yellow Colorless, Straw, Light Yellow, Yellow    Appearance Urine Clear Clear    Glucose  Urine Negative Negative mg/dL    Bilirubin Urine Negative Negative    Ketones Urine Negative Negative mg/dL    Specific Gravity Urine 1.008 1.003 - 1.035    Blood Urine Negative Negative    pH Urine 7.0 5.0 - 7.0    Protein Albumin Urine Negative Negative mg/dL    Urobilinogen Urine Normal Normal, 2.0 mg/dL    Nitrite Urine Negative Negative    Leukocyte Esterase Urine Trace (A) Negative    RBC Urine 1 <=2 /HPF    WBC Urine 9 (H) <=5 /HPF    Squamous Epithelials Urine 3 (H) <=1 /HPF   CBC with platelets and differential   Result Value Ref Range    WBC Count 9.2 4.0 - 11.0 10e3/uL    RBC Count 5.14 3.80 - 5.20 10e6/uL    Hemoglobin 14.9 11.7 - 15.7 g/dL    Hematocrit 44.9 35.0 - 47.0 %    MCV 87 78 - 100 fL    MCH 29.0 26.5 - 33.0 pg    MCHC 33.2 31.5 - 36.5 g/dL    RDW 13.4 10.0 - 15.0 %    Platelet Count 344 150 - 450 10e3/uL    % Neutrophils 75 %    % Lymphocytes 17 %    % Monocytes 6 %    % Eosinophils 1 %    % Basophils 1 %    % Immature Granulocytes 0 %    NRBCs per 100 WBC 0 <1 /100    Absolute Neutrophils 6.9 1.6 - 8.3 10e3/uL    Absolute Lymphocytes 1.6 0.8 - 5.3 10e3/uL    Absolute Monocytes 0.5 0.0 - 1.3 10e3/uL    Absolute Eosinophils 0.1 0.0 - 0.7 10e3/uL    Absolute Basophils 0.1 0.0 - 0.2 10e3/uL    Absolute Immature Granulocytes 0.0 <=0.0 10e3/uL    Absolute NRBCs 0.0 10e3/uL   Extra Red Top Tube   Result Value Ref Range    Hold Specimen JIC    Extra Tube    Narrative    The following orders were created for panel order Extra Tube.  Procedure                               Abnormality         Status                     ---------                               -----------         ------                     Extra Green Top (Lithium...[[id number]]                                                   Please view results for these tests on the individual orders.   MR Brain w/o & w Contrast    Narrative    MRI OF THE BRAIN WITHOUT AND WITH CONTRAST 7/25/2021 11:20 AM     COMPARISON: None.    HISTORY:  Dizziness,  non-specific.     TECHNIQUE: Axial diffusion-weighted with ADC map, axial T2-weighted  with fat saturation, axial T1-weighted, axial turboFLAIR and coronal  T1-weighted images of the brain were acquired without intravenous  contrast.  Following intravenous administration of gadolinium (8 mL  Gadavist), axial T1-weighted images of the brain were acquired.     FINDINGS: There is mild diffuse cerebral volume loss. There are  numerous tiny scattered focal areas of abnormal T2 signal  hyperintensity in the cerebral white matter bilaterally that are  consistent with sequela of chronic small vessel ischemic disease.    The ventricles and basal cisterns are within normal limits in  configuration given the degree of cerebral volume loss.  There is no  midline shift.  There are no extra-axial fluid collections.  There is  no evidence for stroke or acute intracranial hemorrhage.  There is no  abnormal contrast enhancement in the brain or its coverings.    There is no sinusitis or mastoiditis.      Impression    IMPRESSION:  Diffuse cerebral volume loss and cerebral white matter  changes consistent with chronic small vessel ischemic disease. No  evidence for acute intracranial pathology.    LENY DENTON MD         SYSTEM ID:  [id number]       Medications   lactated ringers BOLUS 1,000 mL (0 mLs Intravenous Stopped 7/25/21 1146)   meclizine (ANTIVERT) tablet 50 mg (50 mg Oral Given 7/25/21 1052)   gadobutrol (GADAVIST) injection 8 mL (8 mLs Intravenous Given 7/25/21 1058)       Assessments & Plan (with Medical Decision Making)   Cameron Shah is a 66 year old female who presents to the department for evaluation of vertigo.  She did have some mild nonbloody diarrhea yesterday but otherwise no infectious symptoms.  She is afebrile, hemodynamically stable, and with benign abdominal examination.  She has some horizontal nystagmus without visual field deficits or diplopia, negative test of skew.  MRI read by radiologist as having  no evidence for acute intracranial pathology such as infarct.  Symptoms also improved with meclizine in the department.  Patient seems to be suffering from peripheral vertigo, may be a viral etiology given the recent bout of diarrhea, recommend continuing with meclizine as directed and close monitoring for expected improvement.  A referral was also placed for dizziness and balance clinic in case symptoms do not readily improve.      Disclaimer:  This note consists of symbols derived from keyboarding, dictation, and/or voice recognition software.  As a result, there may be errors in the script that have gone undetected.  Please consider this when interpreting information found in the chart.        I have reviewed the nursing notes.    I have reviewed the findings, diagnosis, plan and need for follow up with the patient.       Discharge Medication List as of 7/25/2021 11:48 AM      START taking these medications    Details   meclizine (ANTIVERT) 25 MG tablet Take 1-2 tablets (25-50 mg) by mouth every 6 hours as needed for dizziness, Disp-30 tablet, R-0, E-Prescribe             Final diagnoses:   Peripheral vertigo, unspecified laterality       7/25/2021   [facility] EMERGENCY DEPT     Arya Kidd,   07/25/21 [id number]

## 2021-09-18 ENCOUNTER — HEALTH MAINTENANCE LETTER (OUTPATIENT)
Age: 66
End: 2021-09-18

## 2022-08-14 ENCOUNTER — HEALTH MAINTENANCE LETTER (OUTPATIENT)
Age: 67
End: 2022-08-14

## 2022-11-19 ENCOUNTER — HEALTH MAINTENANCE LETTER (OUTPATIENT)
Age: 67
End: 2022-11-19

## 2023-03-07 ENCOUNTER — APPOINTMENT (OUTPATIENT)
Dept: GENERAL RADIOLOGY | Facility: CLINIC | Age: 68
End: 2023-03-07
Attending: PHYSICIAN ASSISTANT
Payer: MEDICARE

## 2023-03-07 ENCOUNTER — HOSPITAL ENCOUNTER (EMERGENCY)
Facility: CLINIC | Age: 68
Discharge: HOME | End: 2023-03-07
Attending: PHYSICIAN ASSISTANT | Admitting: PHYSICIAN ASSISTANT
Payer: MEDICARE

## 2023-03-07 VITALS
HEART RATE: 92 BPM | SYSTOLIC BLOOD PRESSURE: 179 MMHG | DIASTOLIC BLOOD PRESSURE: 95 MMHG | RESPIRATION RATE: 18 BRPM | OXYGEN SATURATION: 98 % | TEMPERATURE: 97.5 F
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
## 2023-03-07 DIAGNOSIS — S41.152A DOG BITE OF LEFT ARM, INITIAL ENCOUNTER: ICD-10-CM

## 2023-03-07 PROCEDURE — 99213 OFFICE O/P EST LOW 20 MIN: CPT | Mod: 25 | Performed by: PHYSICIAN ASSISTANT

## 2023-03-07 PROCEDURE — G0463 HOSPITAL OUTPT CLINIC VISIT: HCPCS | Mod: 25 | Performed by: PHYSICIAN ASSISTANT

## 2023-03-07 PROCEDURE — 73070 X-RAY EXAM OF ELBOW: CPT | Mod: LT

## 2023-03-07 PROCEDURE — 12001 RPR S/N/AX/GEN/TRNK 2.5CM/<: CPT | Mod: LT | Performed by: PHYSICIAN ASSISTANT

## 2023-03-07 NOTE — ED PROVIDER NOTES
History     Chief Complaint   Patient presents with     Dog Bite     HPI  Cameron Shah is a 67 year old right-hand-dominant female who presents the urgent care with concern over dog bite to her left arm.  Patient reports that she was walking into animal kennel to pay a bill when a dog that was exiting lashed out and bit her arm unprovoked.  Owner of dog stated that animal is up to date with vaccinations and cattle staff confirmed that dog's did need to be vaccinated for rabies to stay there however she did not get any contact information on the dog or pet owner.  She complains of pain, swelling, ecchymosis of her left upper arm and intermittent paresthesias in her arm.  She has not had any fever, chills, myalgias, cough, dyspnea wheezing.  Her tetanus vaccine is up to date.      Allergies:  Allergies   Allergen Reactions     Oxycodone Nausea       Problem List:    Patient Active Problem List    Diagnosis Date Noted     Family history of factor V deficiency 08/23/2016     Priority: Medium     Mother and brother.  Patient has not been tested.       Hyperparathyroidism (H) 08/22/2016     Priority: Medium     Hypercalcemia 03/22/2016     Priority: Medium     Benign essential hypertension 08/10/2015     Priority: Medium     Anxiety 02/26/2013     Priority: Medium     Advanced directives, counseling/discussion 10/30/2012     Priority: Medium     CARDIOVASCULAR SCREENING; LDL GOAL LESS THAN 160 10/31/2010     Priority: Medium     LT hand Ring Finger Injury at PIP joint 04/22/2009     Priority: Medium     DJD (degenerative joint disease) of cervical spine 10/31/2008     Priority: Medium     Contact dermatitis 10/31/2008     Priority: Medium     Papanicolaou smear of cervix with low grade squamous intraepithelial lesion (LGSIL)      Priority: Medium     cryo and lazer treatment          Past Medical History:    Past Medical History:   Diagnosis Date     Endometriosis, site unspecified      Hypertension 2015      Osteoporosis 2016     Other and unspecified ovarian cyst      Papanicolaou smear of cervix with low grade squamous intraepithelial lesion (LGSIL) [phone]       Past Surgical History:    Past Surgical History:   Procedure Laterality Date     APPENDECTOMY  age 9     HYSTERECTOMY, PAP NO LONGER INDICATED       HYSTERECTOMY, JERAMY       PARATHYROIDECTOMY N/A 2016    Procedure: PARATHYROIDECTOMY;  Surgeon: Yi Pham MD;  Location: [facility] OR     SALPINGO OOPHORECTOMY,R/L/ELIANE      right     TONSILLECTOMY  age 7       Family History:    Family History   Problem Relation Age of Onset     Hypertension Mother      Thrombosis Mother         leg, has Factor V     C.A.D. Father         stents and bypass     Cancer Father         throat     Alcohol/Drug Maternal Grandmother          brain anyersm     Alcohol/Drug Maternal Grandfather      Alcohol/Drug Paternal Grandmother      Alcohol/Drug Paternal Grandfather      Hypertension Brother      Cancer Brother         lumps in arm and shoulder.  3 rounds of chemo     Hypertension Brother      Hypertension Sister      Hypertension Sister      Thrombosis Brother         has factor V     Breast Cancer No family hx of      Colon Cancer No family hx of        Social History:  Marital Status:   [2]  Social History     Tobacco Use     Smoking status: Never     Smokeless tobacco: Never   Substance Use Topics     Alcohol use: Yes     Comment: 2 wine a week     Drug use: No        Medications:    meloxicam (MOBIC) 15 MG tablet  triamterene-HCTZ (DYAZIDE) 37.5-25 MG capsule  Acetaminophen (TYLENOL PO)  IBU-200 200 MG PO TABS  meclizine (ANTIVERT) 25 MG tablet  Naproxen Sodium (ALEVE PO)  TRAVATAN Z 0.004 % ophthalmic solution  triamcinolone (KENALOG) 0.1 % ointment      Review of Systems  CONSTITUTIONAL:NEGATIVE for fever, chills, change in weight  INTEGUMENTARY/SKIN: POSITIVE for ecchymosis, laceration, animal bite   RESP:NEGATIVE for significant cough or  SOB  MUSCULOSKELETAL: POSITIVE  for left arm pain  and NEGATIVE for other concerning arthralgias or myalgias   NEURO: POSITIVE for intermittent paresthesias   Physical Exam   BP: (!) 179/95  Pulse: 92  Temp: 97.5  F (36.4  C)  Resp: 18  SpO2: 98 %  Physical Exam  Constitutional:       General: She is not in acute distress.     Appearance: She is not ill-appearing or toxic-appearing.   HENT:      Head: Normocephalic and atraumatic.   Cardiovascular:      Pulses:           Radial pulses are 2+ on the left side.   Musculoskeletal:      Left upper arm: Swelling, laceration and tenderness present. No edema, deformity or bony tenderness.      Left elbow: Swelling present. No deformity, effusion or lacerations. Normal range of motion. Tenderness present.      Left forearm: Normal.      Comments: 2 triangular shaped lacerations to the lateral dorsal aspect of the left upper arm proximal to the elbow.  The first 1 is superficial and each side measures less than 1 cm.  The second laceration is subcuticular and forms a flap, each side measures approximately 1.5 cm in length.     Skin:     General: Skin is warm and dry.      Findings: Ecchymosis and laceration present.   Neurological:      Mental Status: She is alert.      Sensory: No sensory deficit.       ED Course           Procedures       Critical Care time:  none            Results for orders placed or performed during the hospital encounter of 03/07/23 (from the past 24 hour(s))   Elbow XR, 2 view, left    Narrative    EXAM: XR ELBOW LEFT 2 VIEWS  LOCATION: [facility]  DATE/TIME: 3/7/2023 5:42 PM    INDICATION: Pain, dog bite, assess for fracture, foreign body.  COMPARISON: None.      Impression    IMPRESSION: Soft tissue swelling about the elbow with soft tissue gas related to the patient's history of dog bite, presumably from direct extension. This is most pronounced along the posterior elbow and distal humerus. No radiopaque foreign body or    fracture. No joint effusion. Normal alignment.     Medications - No data to display    Assessments & Plan (with Medical Decision Making)     I have reviewed the nursing notes.  I have reviewed the findings, diagnosis, plan and need for follow up with the patient.       New Prescriptions    AMOXICILLIN-CLAVULANATE (AUGMENTIN) 875-125 MG TABLET    Take 1 tablet by mouth 2 times daily for 7 days     Final diagnoses:   Dog bite of left arm, initial encounter     67-year-old female presents the urgent care with concern over dog bite to her left upper arm which occurred earlier today when she was bit unprovoked by a dog she was attempting to pay a canopy.  Physical exam findings were significant for 2 lacerations to her left upper arm with a large area of ecchymosis consistent with animal bite.  She had x-ray which is negative for evidence of acute fracture, foreign body over did have soft tissue swelling about the elbow with soft tissue gas presumably from direct extension per radiology report.  After discussing her/benefits she did agree to proceed with loose primary closure of her largest laceration given size, depth and location.  She tolerated placement of two 3-0 nylon sutures without immediate complications.  We discussed risk/benefits of rabies vaccination and as patient believes animal was up to date she will defer.  She did actually contact law enforcement while in the department. We also discussed her/benefits of empiric antibiotics and patient elected to initiate per my recommendation given primary closure of one of her wounds.  She was discharged home with prescription for Augmentin twice daily for 7 days.  Follow-up for suture removal in 7 to 10 days.  Suture care instructions, signs of infection, worrisome reasons to return to the ER/[facility] sooner discussed.     Disclaimer: This note consists of symbols derived from keyboarding, dictation, and/or voice recognition software. As a result, there may be errors in  the script that have gone undetected.  Please consider this when interpreting information found in the chart.      3/7/2023   [facility] EMERGENCY DEPT     Mariaa Hills PA-C  03/07/23 [id number]

## 2023-03-07 NOTE — ED TRIAGE NOTES
Pt reports that she was bit by a dog in the left arm today.     Owner told pt that dog is up to date on vaccines.

## 2023-09-10 ENCOUNTER — HEALTH MAINTENANCE LETTER (OUTPATIENT)
Age: 68
End: 2023-09-10

## 2024-11-03 ENCOUNTER — HEALTH MAINTENANCE LETTER (OUTPATIENT)
Age: 69
End: 2024-11-03

## 2024-12-29 ENCOUNTER — HEALTH MAINTENANCE LETTER (OUTPATIENT)
Age: 69
End: 2024-12-29

## (undated) RX ORDER — LIDOCAINE HYDROCHLORIDE 10 MG/ML
INJECTION, SOLUTION EPIDURAL; INFILTRATION; INTRACAUDAL; PERINEURAL
Status: DISPENSED
Start: 2018-02-15

## (undated) RX ORDER — PROPOFOL 10 MG/ML
VIAL (ML) INTRAVENOUS
Status: DISPENSED
Start: 2018-02-15